# Patient Record
Sex: MALE | Race: WHITE
[De-identification: names, ages, dates, MRNs, and addresses within clinical notes are randomized per-mention and may not be internally consistent; named-entity substitution may affect disease eponyms.]

---

## 2019-04-19 ENCOUNTER — HOSPITAL ENCOUNTER (EMERGENCY)
Dept: HOSPITAL 56 - MW.ED | Age: 19
Discharge: HOME | End: 2019-04-19
Payer: MEDICAID

## 2019-04-19 DIAGNOSIS — R10.31: Primary | ICD-10-CM

## 2019-04-19 LAB
CHLORIDE SERPL-SCNC: 102 MMOL/L (ref 98–107)
SODIUM SERPL-SCNC: 140 MMOL/L (ref 136–148)

## 2019-04-19 PROCEDURE — 83690 ASSAY OF LIPASE: CPT

## 2019-04-19 PROCEDURE — 96375 TX/PRO/DX INJ NEW DRUG ADDON: CPT

## 2019-04-19 PROCEDURE — 80053 COMPREHEN METABOLIC PANEL: CPT

## 2019-04-19 PROCEDURE — 36415 COLL VENOUS BLD VENIPUNCTURE: CPT

## 2019-04-19 PROCEDURE — 99284 EMERGENCY DEPT VISIT MOD MDM: CPT

## 2019-04-19 PROCEDURE — 81003 URINALYSIS AUTO W/O SCOPE: CPT

## 2019-04-19 PROCEDURE — 96374 THER/PROPH/DIAG INJ IV PUSH: CPT

## 2019-04-19 PROCEDURE — 96361 HYDRATE IV INFUSION ADD-ON: CPT

## 2019-04-19 PROCEDURE — 74177 CT ABD & PELVIS W/CONTRAST: CPT

## 2019-04-19 PROCEDURE — 85025 COMPLETE CBC W/AUTO DIFF WBC: CPT

## 2019-04-19 NOTE — EDM.PDOC
<Kimberli Scales - Last Filed: 04/19/19 21:58>





ED HPI GENERAL MEDICAL PROBLEM





- General


Chief Complaint: Gastrointestinal Problem


Stated Complaint: PT VOMITING


Time Seen by Provider: 04/19/19 21:03


Source of Information: Reports: Patient


History Limitations: Reports: No Limitations





- History of Present Illness


INITIAL COMMENTS - FREE TEXT/NARRATIVE: 


HISTORY AND PHYSICAL:





History of present illness:


Patient is a 19-year-old male presents to the ED today with concern for right 

lower abdominal pain, nausea, and loose stools 2 days. Patient states that 

since the pain started he has had a decreased appetite. He states he has drank 

some fluids today but has not eaten much for food. Patient states that his 

stools are looser but are not quite diarrhea and has had several episodes 

today. Patient states also he has felt nauseous but has not vomited. Patient 

rates the right lower quadrant pain a 7 out of 10 and states that the pain is 

worse when he tries to eat and better when he does not eat. Patient denies any 

prior surgeries or abdominal surgeries. Patient states he does have a history 

of h pylori but has been treated over the past few months and last had a test 

of cure a few months ago.





Patient denies fever, chills, chest pain, shortness of breath, or cough. Denies 

headache, neck stiff ness, change in vision, syncope, or near syncope. Denies 

testicular or scrotal tenderness, penile drainage,or dysuria. Has not noted any 

blood in urine or stool. 





Review of systems: 


As per history of present illness and below otherwise all systems reviewed and 

negative.





Past medical history: 


As per history of present illness and as reviewed below otherwise 

noncontributory.





Surgical history: 


As per history of present illness and as reviewed below otherwise 

noncontributory.





Social history: 


See social history for further information





Family history: 


As per history of present illness and as reviewed below otherwise 

noncontributory.





Physical exam:


General: Patient is alert, oriented, and in no acute distress. Patient sitting 

comfortably on exam table.


HEENT: Atraumatic, normocephalic, pupils equal and reactive bilaterally, 

negative for conjunctival pallor or scleral icterus, mucous membranes moist, 

TMs normal bilaterally, throat clear, neck supple, nontender, trachea midline. 

No drooling or trismus noted. No meningeal signs. No hot potato voice noted. 


Lungs: Clear to auscultation, breath sounds equal bilaterally, chest nontender.


Heart: S1S2, regular rate and rhythm without overt murmur


Abdomen: Soft, nondistended, nontender. Positive bowel sounds throughout all 

quadrants although slightly hyperactive. Negative for masses or 

hepatosplenomegaly. Negative for costovertebral tenderness.


Pelvis: Stable nontender.


Genitourinary: Deferred.


Rectal: Deferred.


Skin: Intact, warm, dry. No lesions or rashes noted.


Extremities: Atraumatic, negative for cords or calf pain. Neurovascular 

unremarkable.


Neuro: Awake, alert, oriented. Cranial nerves II through XII unremarkable. 

Cerebellum unremarkable. Motor and sensory unremarkable throughout. Exam 

nonfocal.





Notes:


Dr. Wallace has assumed care of this patient and will follow all remaining 

diagnostics and disposition.





Diagnostics:


CBC, CMP, UA, lipase, abdominal pelvic CT





Therapeutics:


Saline, Toradol, Zofran





Prescription:








Impression: 


Right lower abdominal pain, unspecified





Plan:








Definitive disposition and diagnosis as appropriate pending reevaluation and 

review of above.





  ** RLQ abd


Pain Score (Numeric/FACES): 3





- Related Data


 Allergies











Allergy/AdvReac Type Severity Reaction Status Date / Time


 


No Known Allergies Allergy   Verified 04/19/19 20:59











Home Meds: 


 Home Meds





. [No Known Home Meds]  04/19/19 [History]











Past Medical History





- Past Health History


Medical/Surgical History: Denies Medical/Surgical History


Gastrointestinal History: Reports: Helicobacter Pylori





Social & Family History





- Family History


Family Medical History: Noncontributory





- Tobacco Use


Smoking Status *Q: Never Smoker





- Recreational Drug Use


Recreational Drug Use: No





ED ROS GENERAL





- Review of Systems


Review Of Systems: ROS reveals no pertinent complaints other than HPI.





ED EXAM, GI/ABD





- Physical Exam


Exam: See Below (See dictation)





Course





- Vital Signs


Last Recorded V/S: 





 Last Vital Signs











Temp  98.1 F   04/19/19 22:56


 


Pulse  81   04/19/19 22:56


 


Resp  18   04/19/19 22:56


 


BP  113/53 L  04/19/19 22:56


 


Pulse Ox  98   04/19/19 22:56














- Orders/Labs/Meds


Labs: 





 Laboratory Tests











  04/19/19 04/19/19 04/19/19 Range/Units





  21:15 21:18 21:18 


 


WBC   7.25   (4.0-11.0)  K/uL


 


RBC   5.18   (4.50-5.90)  M/uL


 


Hgb   15.3   (13.0-17.0)  g/dL


 


Hct   45.4   (38.0-50.0)  %


 


MCV   87.6   (80.0-98.0)  fL


 


MCH   29.5   (27.0-32.0)  pg


 


MCHC   33.7   (31.0-37.0)  g/dL


 


RDW Std Deviation   42.3   (28.0-62.0)  fl


 


RDW Coeff of Nabila   13   (11.0-15.0)  %


 


Plt Count   182   (150-400)  K/uL


 


MPV   10.50   (7.40-12.00)  fL


 


Neut % (Auto)   61.5   (48.0-80.0)  %


 


Lymph % (Auto)   21.9   (16.0-40.0)  %


 


Mono % (Auto)   15.9 H   (0.0-15.0)  %


 


Eos % (Auto)   0.3   (0.0-7.0)  %


 


Baso % (Auto)   0.4   (0.0-1.5)  %


 


Neut # (Auto)   4.5   (1.4-5.7)  K/uL


 


Lymph # (Auto)   1.6   (0.6-2.4)  K/uL


 


Mono # (Auto)   1.2 H   (0.0-0.8)  K/uL


 


Eos # (Auto)   0.0   (0.0-0.7)  K/uL


 


Baso # (Auto)   0.0   (0.0-0.1)  K/uL


 


Nucleated RBC %   0.0   /100WBC


 


Nucleated RBCs #   0   K/uL


 


Sodium    140  (136-148)  mmol/L


 


Potassium    3.6  (3.5-5.1)  mmol/L


 


Chloride    102  ()  mmol/L


 


Carbon Dioxide    27.5  (21.0-32.0)  mmol/L


 


BUN    17  (7.0-18.0)  mg/dL


 


Creatinine    1.2  (0.8-1.3)  mg/dL


 


Est Cr Clr Drug Dosing    110.97  mL/min


 


Estimated GFR (MDRD)    > 60.0  ml/min


 


Glucose    94  ()  mg/dL


 


Calcium    8.8  (8.5-10.1)  mg/dL


 


Total Bilirubin    2.3 H  (0.2-1.0)  mg/dL


 


AST    21  (15-37)  IU/L


 


ALT    18  (14-63)  IU/L


 


Alkaline Phosphatase    93  ()  U/L


 


Total Protein    7.3  (6.4-8.2)  g/dL


 


Albumin    4.2  (3.4-5.0)  g/dL


 


Globulin    3.1  (2.6-4.0)  g/dL


 


Albumin/Globulin Ratio    1.4  (0.9-1.6)  


 


Lipase    72 L  ()  U/L


 


Urine Color  DARK YELLOW    


 


Urine Appearance  HAZY    


 


Urine pH  5.5    (5.0-8.0)  


 


Ur Specific Gravity  1.025    (1.001-1.035)  


 


Urine Protein  NEGATIVE    (NEGATIVE)  mg/dL


 


Urine Glucose (UA)  NEGATIVE    (NEGATIVE)  mg/dL


 


Urine Ketones  15 H    (NEGATIVE)  mg/dL


 


Urine Occult Blood  NEGATIVE    (NEGATIVE)  


 


Urine Nitrite  NEGATIVE    (NEGATIVE)  


 


Urine Bilirubin  SMALL H    (NEGATIVE)  


 


Urine Ictotest  NEGATIVE    


 


Urine Urobilinogen  0.2    (<2.0)  EU/dL


 


Ur Leukocyte Esterase  NEGATIVE    (NEGATIVE)  











Meds: 





Medications














Discontinued Medications














Generic Name Dose Route Start Last Admin





  Trade Name Freq  PRN Reason Stop Dose Admin


 


Sodium Chloride  1,000 mls @ 999 mls/hr  04/19/19 21:04  04/19/19 21:19





  Normal Saline  IV  04/19/19 22:04  999 mls/hr





  BOLUS ONE   Administration





     





     





     





     


 


Iopamidol  100 ml  04/19/19 22:07  04/19/19 22:19





  Isovue-370 (76%)  IVPUSH  04/19/19 22:08  100 ml





  ONETIME ONE   Administration





     





     





     





     


 


Ketorolac Tromethamine  30 mg  04/19/19 21:04  04/19/19 21:21





  Toradol  IVPUSH  04/19/19 21:05  30 mg





  ONETIME ONE   Administration





     





     





     





     


 


Ondansetron HCl  4 mg  04/19/19 21:04  04/19/19 21:19





  Zofran  IVPUSH  04/19/19 21:05  4 mg





  ONETIME ONE   Administration





     





     





     





     














Departure





- Departure


Disposition: Home, Self-Care 01


Clinical Impression: 


 Abdominal pain








- Discharge Information


Referrals: 


PCP,None [Primary Care Provider] - 


Forms:  ED Department Discharge


Additional Instructions: 


The following information is given to patients seen in the emergency department 

who are being discharged to home. This information is to outline your options 

for follow-up care. We provide all patients seen in our emergency department 

with a follow-up referral.





The need for follow-up, as well as the timing and circumstances, are variable 

depending upon the specifics of your emergency department visit.





If you don't have a primary care physician on staff, we will provide you with a 

referral. We always advise you to contact your personal physician following an 

emergency department visit to inform them of the circumstance of the visit and 

for follow-up with them and/or the need for any referrals to a consulting 

specialist.





The emergency department will also refer you to a specialist when appropriate. 

This referral assures that you have the opportunity for follow-up care with a 

specialist. All of these measure are taken in an effort to provide you with 

optimal care, which includes your follow-up.





Under all circumstances we always encourage you to contact your private 

physician who remains a resource for coordinating your care. When calling for 

follow-up care, please make the office aware that this follow-up is from your 

recent emergency room visit. If for any reason you are refused follow-up, 

please contact the [facility] emergency department at [phone] 

and asked to speak to the emergency department charge nurse.








<Hong Wallace - Last Filed: 04/19/19 23:44>





ED HPI GENERAL MEDICAL PROBLEM





- History of Present Illness


INITIAL COMMENTS - FREE TEXT/NARRATIVE: 





Patient presented with intermittent right lower quadrant pain for 2 days of 

vomiting history of H. pylori previously treated with a Prevpac he has 

persistent GERD symptoms pain is 0 out of 10 resolved at current





Lab and imaging within normal limits





Impression


h/o H. pylori


Abdominal pain





Therapeutics


Prepac





Definitive disposition and diagnosis as appropriate pending reevaluation and 

review of abovevpac

















ED ROS GENERAL





- Review of Systems


Review Of Systems: See Below





ED EXAM, GI/ABD





- Physical Exam


Exam: See Below





Departure





- Departure


Time of Disposition: 23:43


Condition: Good

## 2019-04-19 NOTE — CT
INDICATION:



Abdominal pain 



TECHNIQUE:



CT abdomen and pelvis acquired with IV contrast. 100 cc Isovue 370 



COMPARISON:



None 



FINDINGS:



Lower chest: Unremarkable.  



Liver: Unremarkable.  



Spleen: Unremarkable.  



Pancreas: Unremarkable.  



Gallbladder and bile ducts: Unremarkable.  



Kidneys: Unremarkable.  



Adrenal glands: Unremarkable.  



GI tract: Unremarkable.  Appendix is normal.  



Vascular structures: Unremarkable.  



Lymph nodes: Unremarkable.  



Miscellaneous: Unremarkable.  No free air or significant free fluid.  



Pelvic Organs: Unremarkable.  



Bones: Unremarkable for age.  



IMPRESSION:



Unremarkable CT of the abdomen and pelvis.



Dictated by Donovan Kerr MD @ 4/19/2019 11:16:41 PM



Please note that all CT scans at this facility use dose modulation, 

iterative reconstruction, and/or weight-based dosing when appropriate to 

reduce radiation dose to as low as reasonably achievable.



Dictated by: Donovan Kerr MD @ 04/19/2019 23:16:47



(Electronically Signed)